# Patient Record
Sex: FEMALE | Race: WHITE | URBAN - METROPOLITAN AREA
[De-identification: names, ages, dates, MRNs, and addresses within clinical notes are randomized per-mention and may not be internally consistent; named-entity substitution may affect disease eponyms.]

---

## 2022-03-18 PROBLEM — M18.9 OSTEOARTHRITIS OF CARPOMETACARPAL (CMC) JOINT OF THUMB: Status: ACTIVE | Noted: 2019-05-15

## 2022-03-19 PROBLEM — H02.832 DERMATOCHALASIS OF BOTH LOWER EYELIDS: Status: ACTIVE | Noted: 2020-09-17

## 2022-03-19 PROBLEM — H02.835 DERMATOCHALASIS OF BOTH LOWER EYELIDS: Status: ACTIVE | Noted: 2020-09-17

## 2022-03-19 PROBLEM — M19.049 LOCALIZED, PRIMARY OSTEOARTHRITIS OF HAND: Status: ACTIVE | Noted: 2019-05-15

## 2022-12-05 ENCOUNTER — TELEPHONE (OUTPATIENT)
Dept: ORTHOPEDIC SURGERY | Age: 72
End: 2022-12-05

## 2022-12-06 NOTE — TELEPHONE ENCOUNTER
Called and scheduled patient for repeat injections with [facility] and her yearly appointment 12/15 GR 2:15

## 2022-12-15 ENCOUNTER — OFFICE VISIT (OUTPATIENT)
Dept: ORTHOPEDIC SURGERY | Age: 72
End: 2022-12-15
Payer: MEDICARE

## 2022-12-15 DIAGNOSIS — M54.17 RADICULOPATHY, LUMBOSACRAL REGION: Primary | ICD-10-CM

## 2022-12-15 DIAGNOSIS — M47.816 SPONDYLOSIS WITHOUT MYELOPATHY OR RADICULOPATHY, LUMBAR REGION: ICD-10-CM

## 2022-12-15 PROCEDURE — 64484 NJX AA&/STRD TFRM EPI L/S EA: CPT | Performed by: PHYSICAL MEDICINE & REHABILITATION

## 2022-12-15 PROCEDURE — 64483 NJX AA&/STRD TFRM EPI L/S 1: CPT | Performed by: PHYSICAL MEDICINE & REHABILITATION

## 2022-12-15 PROCEDURE — 64493 INJ PARAVERT F JNT L/S 1 LEV: CPT | Performed by: PHYSICAL MEDICINE & REHABILITATION

## 2022-12-15 RX ORDER — TRIAMCINOLONE ACETONIDE 40 MG/ML
280 INJECTION, SUSPENSION INTRA-ARTICULAR; INTRAMUSCULAR ONCE
Status: COMPLETED | OUTPATIENT
Start: 2022-12-15 | End: 2022-12-15

## 2022-12-15 RX ADMIN — TRIAMCINOLONE ACETONIDE 280 MG: 40 INJECTION, SUSPENSION INTRA-ARTICULAR; INTRAMUSCULAR at 15:09

## 2022-12-15 NOTE — PROGRESS NOTES
Date:  12/15/22     HPI:  I am seeing Corrie Salgado in evalution/folowup. I saw her in the office setting most recently in September of last year. In the past pain could gravitate down either lower extremity lateral, had nice response to bilateral L5 selective nerve root blocks. Facet injections have had benefit and also injections for pain gravitating down the posterior aspect of the left leg. Most recent injections were bilateral facets with a left S1 selective nerve root block and that did have significant benefit. This lasted for over 6 months. Injections prior to that only work for about a month or so. She is having more pain going down the right leg posteriorly and laterally. Notes it more in the morning, with walking late in the day. Limits her ability to walk and do other things. She has had a tear duct procedure that she is recovering from. ROS: As above    PE: Alert and cooperative good strength in the lower extremities. No lateralizing sensory findings. She has tenderness in the lower lumbar paraspinal area as well as the right upper buttock area. Assessment/Plan:  PREDOMINANTLY MUSCULOSKELETAL LUMBOSACRAL  SPINE PAIN. Facet joint arthropathy but also symptoms in a right L5 and S1 distribution. These injections have worked pretty well for her. She does have significant difficulties. I recommend repeating those injections and then go from there. I do not think we need to reimage at this time.     Nataliya Olsen MD

## 2022-12-15 NOTE — PROGRESS NOTES
Date: 12/15/22   Name: Beckie Terrell    Pre-Procedural Diagnosis:    Diagnosis Orders   1. Radiculopathy, lumbosacral region  FL NERVE BLOCK LUMBOSACRAL 1ST    FL NERVE BLOCK LUMBOSACRAL EACH ADD      2. Spondylosis without myelopathy or radiculopathy, lumbar region  FL INJ LUMB/SAC FACET SINGLE LEVEL          Procedure: Selective Nerve Root Blocks (Transforaminal) - Multiple Level with lumbar facet joint injection(s)    Precautions: [facility] Precautions spine injections: None. Patient denies any prior sensitivity to steroid, local anesthetic, contrast dye, iodine or shellfish. The procedure was discussed at length with the patient and informed consent was signed. The patient was placed in a prone position on the fluoroscopy table and the skin was prepped and draped in a routine sterile fashion. The areas to be injected were each anesthetized with approximately 5 cc of 1% Lidocaine. A 22-gauge 3.5 inch inch spinal needle was carefully advanced under fluoroscopic guidance to the right L5 transforaminal space and subsequently the right S1 transforaminal space. At this time 0.25 cc of omnipaque administered. . Once proper placement was confirmed, 2 cc of 0.25% Marcaine and 80 mg of Kenalog were injected through the spinal needle at each site. After informed oral consent, patient was prepped per routine. The bilateral L5-S1 facet joint(s) were injected. 60 mg of Kenalog with 1 cc of 0.25% Marcaine injected at each site. Patient tolerated well. Band aid(s) applied. Fluoroscopic guidance was used intermittently over a 10-minute period to insure proper needle placement and patient safety. A hard copy of the fluoroscopic  images has been placed in the patient's chart. The patient was monitored after the procedure and discharged home in stable fashion.      Resume Meds:  N/A    Neftaly Moore MD  12/15/22

## 2023-03-14 ENCOUNTER — TELEPHONE (OUTPATIENT)
Dept: ORTHOPEDIC SURGERY | Age: 73
End: 2023-03-14

## 2023-03-14 NOTE — TELEPHONE ENCOUNTER
Pt  ask for an injection  this  week  or 1st of  next  week. She  drives  up  from [place] .  She needs a late  afternoon appt  so she gets the injection and  spends the  night    It is for  her   Right side and part of  her left

## 2023-03-14 NOTE — TELEPHONE ENCOUNTER
Called and scheduled patient for an injection with Dr. Luis Carlos Portillo 3/16 1750 [facility] 3:00pm

## 2023-03-16 ENCOUNTER — OFFICE VISIT (OUTPATIENT)
Dept: ORTHOPEDIC SURGERY | Age: 73
End: 2023-03-16

## 2023-03-16 DIAGNOSIS — M47.816 SPONDYLOSIS WITHOUT MYELOPATHY OR RADICULOPATHY, LUMBAR REGION: ICD-10-CM

## 2023-03-16 DIAGNOSIS — M54.17 RADICULOPATHY, LUMBOSACRAL REGION: Primary | ICD-10-CM

## 2023-03-16 RX ORDER — TRIAMCINOLONE ACETONIDE 40 MG/ML
240 INJECTION, SUSPENSION INTRA-ARTICULAR; INTRAMUSCULAR ONCE
Status: COMPLETED | OUTPATIENT
Start: 2023-03-16 | End: 2023-03-16

## 2023-03-16 RX ADMIN — TRIAMCINOLONE ACETONIDE 240 MG: 40 INJECTION, SUSPENSION INTRA-ARTICULAR; INTRAMUSCULAR at 15:32

## 2023-03-16 NOTE — PROGRESS NOTES
Date: 03/16/23   Name: Chio Flores    Pre-Procedural Diagnosis:    Diagnosis Orders   1. Radiculopathy, lumbosacral region  FL NERVE BLOCK LUMBOSACRAL 1ST    FL NERVE BLOCK LUMBOSACRAL EACH ADD    triamcinolone acetonide (KENALOG-40) injection 240 mg      2. Spondylosis without myelopathy or radiculopathy, lumbar region  triamcinolone acetonide (KENALOG-40) injection 240 mg      Severe pain in bilateral LE posteriorly, L greater than R. Also pain in lateral aspect of LLE. Pain on L goes to ankle. Procedure: Bilateral Selective Nerve Root Blocks (Transforaminal) - Multiple level, bilateral    Precautions: [facility] Precautions spine injections: None. Patient denies any prior sensitivity to steroid, local anesthetic, contrast dye, iodine or shellfish. The procedure was discussed at length with the patient and informed consent was signed. The patient was placed in a prone position on the fluoroscopy table and the skin was prepped and draped in a routine sterile fashion. The areas to be injected was/were anesthetized with approximately 5 cc of 1% Lidocaine. A 22-gauge 3.5 inch spinal needle was carefully advanced under fluoroscopic guidance to the left L5 and then bilateral S1 transforaminal spaces. At this time 0.25 cc of omnipaque administered. Once proper placement was confirmed, 2 cc of 0.25% Marcaine and 80 mg of Kenalog were injected through the spinal needle at each site. Fluoroscopic guidance was used intermittently over a 10-minute period to insure proper needle placement and patient safety. A hard copy of the fluoroscopic  images has been placed in the patient's chart. The patient was monitored after the procedure and discharged home in stable fashion.      Resume Meds:   N/A    Hamzah Manzanares MD  03/16/23

## 2023-09-11 ENCOUNTER — TELEPHONE (OUTPATIENT)
Dept: ORTHOPEDIC SURGERY | Age: 73
End: 2023-09-11

## 2023-09-15 ENCOUNTER — OFFICE VISIT (OUTPATIENT)
Dept: ORTHOPEDIC SURGERY | Age: 73
End: 2023-09-15

## 2023-09-15 DIAGNOSIS — M54.17 RADICULOPATHY, LUMBOSACRAL REGION: Primary | ICD-10-CM

## 2023-09-15 DIAGNOSIS — M47.816 SPONDYLOSIS OF LUMBAR REGION WITHOUT MYELOPATHY OR RADICULOPATHY: ICD-10-CM

## 2023-09-15 RX ORDER — TRIAMCINOLONE ACETONIDE 40 MG/ML
240 INJECTION, SUSPENSION INTRA-ARTICULAR; INTRAMUSCULAR ONCE
Status: COMPLETED | OUTPATIENT
Start: 2023-09-15 | End: 2023-09-15

## 2023-09-15 RX ADMIN — TRIAMCINOLONE ACETONIDE 240 MG: 40 INJECTION, SUSPENSION INTRA-ARTICULAR; INTRAMUSCULAR at 15:42

## 2023-09-15 NOTE — PROGRESS NOTES
Date: 09/15/23   Name: Yonatan Whelan    Pre-Procedural Diagnosis:    Diagnosis Orders   1. Radiculopathy, lumbosacral region  FL NERVE BLOCK LUMBOSACRAL 1ST    FL NERVE BLOCK LUMBOSACRAL EACH ADD    triamcinolone acetonide (KENALOG-40) injection 240 mg      2. Spondylosis of lumbar region without myelopathy or radiculopathy        Pain across bilateral paraspinal areas and down the posterolateral LLE. Procedure: Selective Nerve Root Blocks (Transforaminal) - Multiple Level with lumbar facet joint injection(s)    Precautions: [facility] Precautions spine injections: None. Patient denies any prior sensitivity to steroid, local anesthetic, contrast dye, iodine or shellfish. The procedure was discussed at length with the patient and informed consent was signed. The patient was placed in a prone position on the fluoroscopy table and the skin was prepped and draped in a routine sterile fashion. The areas to be injected were each anesthetized with approximately 5 cc of 1% Lidocaine. A 22-gauge 3.5 inch spinal needle was carefully advanced under fluoroscopic guidance to the left L5 transforaminal space and subsequently the left S1 transforaminal space. At this time 0.25 cc of omnipaque administered. . Once proper placement was confirmed, 2 cc of 0.25% Marcaine and 80 mg of Kenalog were injected through the spinal needle at each site. After informed oral consent, patient was prepped per routine. The bilateral L5-S1 facet joint(s) were injected. 60 mg of Kenalog with 1 cc of 0.25% Marcaine injected at each site. Patient tolerated well. Band aid(s) applied. Fluoroscopic guidance was used intermittently over a 10-minute period to insure proper needle placement and patient safety. A hard copy of the fluoroscopic  images has been placed in the patient's chart. The patient was monitored after the procedure and discharged home in stable fashion.      A total of 7 cc of Kenalog were administered during this

## 2023-11-14 ENCOUNTER — TELEPHONE (OUTPATIENT)
Dept: ORTHOPEDIC SURGERY | Age: 73
End: 2023-11-14

## 2023-11-16 ENCOUNTER — OFFICE VISIT (OUTPATIENT)
Dept: ORTHOPEDIC SURGERY | Age: 73
End: 2023-11-16
Payer: MEDICARE

## 2023-11-16 DIAGNOSIS — M54.16 LUMBAR RADICULOPATHY: Primary | ICD-10-CM

## 2023-11-16 DIAGNOSIS — M47.816 SPONDYLOSIS OF LUMBAR REGION WITHOUT MYELOPATHY OR RADICULOPATHY: ICD-10-CM

## 2023-11-16 DIAGNOSIS — M54.50 LUMBAR BACK PAIN: ICD-10-CM

## 2023-11-16 DIAGNOSIS — M54.17 RADICULOPATHY, LUMBOSACRAL REGION: Primary | ICD-10-CM

## 2023-11-16 PROCEDURE — G8484 FLU IMMUNIZE NO ADMIN: HCPCS | Performed by: PHYSICAL MEDICINE & REHABILITATION

## 2023-11-16 PROCEDURE — G8421 BMI NOT CALCULATED: HCPCS | Performed by: PHYSICAL MEDICINE & REHABILITATION

## 2023-11-16 PROCEDURE — 64483 NJX AA&/STRD TFRM EPI L/S 1: CPT | Performed by: PHYSICAL MEDICINE & REHABILITATION

## 2023-11-16 PROCEDURE — 1123F ACP DISCUSS/DSCN MKR DOCD: CPT | Performed by: PHYSICAL MEDICINE & REHABILITATION

## 2023-11-16 PROCEDURE — 64493 INJ PARAVERT F JNT L/S 1 LEV: CPT | Performed by: PHYSICAL MEDICINE & REHABILITATION

## 2023-11-16 PROCEDURE — G8399 PT W/DXA RESULTS DOCUMENT: HCPCS | Performed by: PHYSICAL MEDICINE & REHABILITATION

## 2023-11-16 PROCEDURE — 64484 NJX AA&/STRD TFRM EPI L/S EA: CPT | Performed by: PHYSICAL MEDICINE & REHABILITATION

## 2023-11-16 PROCEDURE — 1090F PRES/ABSN URINE INCON ASSESS: CPT | Performed by: PHYSICAL MEDICINE & REHABILITATION

## 2023-11-16 PROCEDURE — 3017F COLORECTAL CA SCREEN DOC REV: CPT | Performed by: PHYSICAL MEDICINE & REHABILITATION

## 2023-11-16 PROCEDURE — 4004F PT TOBACCO SCREEN RCVD TLK: CPT | Performed by: PHYSICAL MEDICINE & REHABILITATION

## 2023-11-16 PROCEDURE — G8428 CUR MEDS NOT DOCUMENT: HCPCS | Performed by: PHYSICAL MEDICINE & REHABILITATION

## 2023-11-16 PROCEDURE — 99213 OFFICE O/P EST LOW 20 MIN: CPT | Performed by: PHYSICAL MEDICINE & REHABILITATION

## 2023-11-16 RX ORDER — TRIAMCINOLONE ACETONIDE 40 MG/ML
280 INJECTION, SUSPENSION INTRA-ARTICULAR; INTRAMUSCULAR ONCE
Status: COMPLETED | OUTPATIENT
Start: 2023-11-16 | End: 2023-11-16

## 2023-11-16 RX ADMIN — TRIAMCINOLONE ACETONIDE 280 MG: 40 INJECTION, SUSPENSION INTRA-ARTICULAR; INTRAMUSCULAR at 16:52

## 2023-11-16 NOTE — PROGRESS NOTES
Date: 11/16/23   Name: Phil Helm    Pre-Procedural Diagnosis:    Diagnosis Orders   1. Radiculopathy, lumbosacral region  FL NERVE BLOCK LUMBOSACRAL 1ST    FL NERVE BLOCK LUMBOSACRAL EACH ADD    triamcinolone acetonide (KENALOG-40) injection 280 mg      2. Spondylosis of lumbar region without myelopathy or radiculopathy  FL INJ LUMB/SAC FACET SINGLE LEVEL    triamcinolone acetonide (KENALOG-40) injection 280 mg          Procedure: Selective Nerve Root Blocks (Transforaminal) - Multiple Level with lumbar facet joint injection(s)    Precautions: [facility] Precautions spine injections: None. Patient denies any prior sensitivity to steroid, local anesthetic, contrast dye, iodine or shellfish. The procedure was discussed at length with the patient and informed consent was signed. The patient was placed in a prone position on the fluoroscopy table and the skin was prepped and draped in a routine sterile fashion. The areas to be injected were each anesthetized with approximately 5 cc of 1% Lidocaine. A 22-gauge 3.5 inch spinal needle was carefully advanced under fluoroscopic guidance to the right L5 transforaminal space and subsequently the right S1 transforaminal space. At this time 0.25 cc of omnipaque administered. . Once proper placement was confirmed, 2 cc of 0.25% Marcaine and 80 mg of Kenalog were injected through the spinal needle at each site. After informed oral consent, patient was prepped per routine. The bilateral L5-S1 facet joint(s) were injected. 60 mg of Kenalog with 1 cc of 0.25% Marcaine injected at each site. Patient tolerated well. Band aid(s) applied. Fluoroscopic guidance was used intermittently over a 10-minute period to insure proper needle placement and patient safety. A hard copy of the fluoroscopic  images has been placed in the patient's chart. The patient was monitored after the procedure and discharged home in stable fashion.      A total of 7 cc of Kenalog were administered

## 2023-11-16 NOTE — PROGRESS NOTES
Date:  11/16/23     HPI:  I am seeing Janice Ferguson in evalution/folowup. Her 2 months ago for which she underwent injections for lower lumbar paraspinal pain with symptoms gravitating down the posterior and lateral aspect of the left leg. In the past she has had nice response to facet injections and/or selective nerve root blocks improperly placed. She had 2 months of benefit from last set of injections the pain has come back in a similar fashion. The most noted pain might actually be in the lower lumbar paraspinal areas that has been catching and producing severe pain. When the pain is more noted she has trouble ambulating but when injections are helping or she is doing well she gets around pretty well. I cannot generate a history of a progressive gait disturbance. Current symptoms across lower lumbar paraspinal areas and now down the posterior lateral aspect of the right leg. ROS: In the interim she has had some issues with squamous cell skin cancer has been undergoing treatment. PE: Cooperative. Reasonably good strength in lower extremities. No lateralizing findings. Depressed reflexes. She has tenderness mainly in the lower lumbar paraspinal areas. Good range of motion of the hips. Assessment/Plan:       PREDOMINANTLY MUSCULOSKELETAL LUMBOSACRAL  SPINE PAIN. Had a favorable response to injections 2 months ago, the left leg is actually doing pretty well, there was had recurrent symptoms in the lower lumbar paraspinal areas and now down the right leg. Would recommend bilateral L5-S1 facet injections with right L5 and S1 selective nerve root blocks. I do not think we need to reimage. She is still responding nicely to appropriately placed injections and has not developed any type of progressive neurologic issue. If symptoms change to warrant, we can reimage.             Ricihe Gil MD

## 2024-03-05 ENCOUNTER — TELEPHONE (OUTPATIENT)
Dept: ORTHOPEDIC SURGERY | Age: 74
End: 2024-03-05

## 2024-03-07 ENCOUNTER — OFFICE VISIT (OUTPATIENT)
Dept: ORTHOPEDIC SURGERY | Age: 74
End: 2024-03-07
Payer: MEDICARE

## 2024-03-07 DIAGNOSIS — M47.816 SPONDYLOSIS OF LUMBAR REGION WITHOUT MYELOPATHY OR RADICULOPATHY: ICD-10-CM

## 2024-03-07 DIAGNOSIS — M54.16 LUMBAR RADICULOPATHY: Primary | ICD-10-CM

## 2024-03-07 PROCEDURE — 64483 NJX AA&/STRD TFRM EPI L/S 1: CPT | Performed by: PHYSICAL MEDICINE & REHABILITATION

## 2024-03-07 PROCEDURE — 64493 INJ PARAVERT F JNT L/S 1 LEV: CPT | Performed by: PHYSICAL MEDICINE & REHABILITATION

## 2024-03-07 RX ORDER — TRIAMCINOLONE ACETONIDE 40 MG/ML
280 INJECTION, SUSPENSION INTRA-ARTICULAR; INTRAMUSCULAR ONCE
Status: COMPLETED | OUTPATIENT
Start: 2024-03-07 | End: 2024-03-07

## 2024-03-07 RX ADMIN — TRIAMCINOLONE ACETONIDE 280 MG: 40 INJECTION, SUSPENSION INTRA-ARTICULAR; INTRAMUSCULAR at 17:03

## 2024-03-07 NOTE — PROGRESS NOTES
Date: 03/07/24   Name: Lexi Melendez    Pre-Procedural Diagnosis:    Diagnosis Orders   1. Lumbar radiculopathy  triamcinolone acetonide (KENALOG-40) injection 280 mg    FL INJ LUMB/SAC FACET SINGLE LEVEL    FL NERVE BLOCK LUMBOSACRAL 1ST      2. Spondylosis of lumbar region without myelopathy or radiculopathy  triamcinolone acetonide (KENALOG-40) injection 280 mg    FL INJ LUMB/SAC FACET SINGLE LEVEL    FL NERVE BLOCK LUMBOSACRAL 1ST          Procedure: Selective Nerve Root Blocks (Transforaminal) - Single Level with lumbar facet joint injection(s)    Precautions: [facility] Precautions spine injections: None.  Patient denies any prior sensitivity to steroid, local anesthetic, contrast dye, iodine or shellfish.    The procedure was discussed at length with the patient and informed consent was signed. The patient was placed in a prone position on the fluoroscopy table and the skin was prepped and draped in a routine sterile fashion. The areas to be injected was/were anesthetized with approximately 5 cc of 1% Lidocaine. A 22-gauge 3.5 inch inch spinal needle was carefully advanced under fluoroscopic guidance to the bilateral S1 transforaminal space(s). At this time 0.25 cc of omnipaque administered. Once proper placement was confirmed, 2 cc of 0.25% Marcaine and 80 mg of Kenalog were injected through the spinal needle at that/each site.     After informed oral consent, patient was prepped per routine. The bilateral L5-S1 facet joint(s) were injected. 60 mg of Kenalog with 1 cc of 0.25% Marcaine injected at each site. Patient tolerated well. Band aid(s) applied.     Fluoroscopic guidance was used intermittently over a 10-minute period to insure proper needle placement and patient safety. A hard copy of the fluoroscopic  images has been placed in the patient's chart. The patient was monitored after the procedure for over 15 minutes due to mild weakness in the lower extremities bililaterally but she was able to ambulate